# Patient Record
Sex: FEMALE | Race: BLACK OR AFRICAN AMERICAN | NOT HISPANIC OR LATINO | RURAL
[De-identification: names, ages, dates, MRNs, and addresses within clinical notes are randomized per-mention and may not be internally consistent; named-entity substitution may affect disease eponyms.]

---

## 2021-03-12 NOTE — PROCEDURE NOTE: CLINICAL
PROCEDURE NOTE: Punctal Plugs, Silicone OS. Diagnosis: Dry Eye Syndrome. Anesthesia: Topical. Prep: Antibiotic Drops q 5min x 3. Prior to treatment, the risks/benefits/alternatives were discussed. The patient wished to proceed with procedure. One drop of proparacaine was placed and a drop of lidocaine gel was placed over the puncta. Inserted Oasis Softplug 0.8 mm permanent silicone plugs were inserted in Left Lower eyelids. Patient tolerated procedure well. There were no complications. Post procedure instructions given. Alyse Reich

## 2021-04-06 NOTE — PROCEDURE NOTE: SURGICAL
"<p style=""margin-bottom:0in;text-align:justify;line-height:normal;""><span>Prior to commencing surgery patient identification

## 2022-05-18 ENCOUNTER — NEW PATIENT (OUTPATIENT)
Dept: RURAL CLINIC 2 | Facility: CLINIC | Age: 72
End: 2022-05-18

## 2022-05-18 DIAGNOSIS — E11.9: ICD-10-CM

## 2022-05-18 DIAGNOSIS — H52.4: ICD-10-CM

## 2022-05-18 DIAGNOSIS — H25.813: ICD-10-CM

## 2022-05-18 DIAGNOSIS — H16.223: ICD-10-CM

## 2022-05-18 DIAGNOSIS — H40.1134: ICD-10-CM

## 2022-05-18 DIAGNOSIS — H43.812: ICD-10-CM

## 2022-05-18 PROCEDURE — 99204 OFFICE O/P NEW MOD 45 MIN: CPT

## 2022-05-18 PROCEDURE — 92015 DETERMINE REFRACTIVE STATE: CPT

## 2022-05-18 ASSESSMENT — VISUAL ACUITY
OS_CC: 20/20
OD_CC: 20/20

## 2022-05-18 ASSESSMENT — TONOMETRY
OD_IOP_MMHG: 25
OS_IOP_MMHG: 23

## 2022-06-24 ENCOUNTER — ESTABLISHED PATIENT (OUTPATIENT)
Dept: RURAL CLINIC 2 | Facility: CLINIC | Age: 72
End: 2022-06-24

## 2022-06-24 DIAGNOSIS — H43.812: ICD-10-CM

## 2022-06-24 DIAGNOSIS — H40.1131: ICD-10-CM

## 2022-06-24 DIAGNOSIS — H16.223: ICD-10-CM

## 2022-06-24 DIAGNOSIS — H25.813: ICD-10-CM

## 2022-06-24 DIAGNOSIS — E11.9: ICD-10-CM

## 2022-06-24 PROCEDURE — 99213 OFFICE O/P EST LOW 20 MIN: CPT

## 2022-06-24 PROCEDURE — 92020 GONIOSCOPY: CPT

## 2022-06-24 PROCEDURE — 92083 EXTENDED VISUAL FIELD XM: CPT

## 2022-06-24 ASSESSMENT — VISUAL ACUITY
OD_CC: 20/20
OS_CC: 20/20

## 2022-06-24 ASSESSMENT — TONOMETRY
OS_IOP_MMHG: 21
OD_IOP_MMHG: 21

## 2022-06-24 NOTE — PATIENT DISCUSSION
The IOP is borderline. The patient will continue the current management at this time.  LATANOPROST QHS OU.

## 2023-04-03 ENCOUNTER — ESTABLISHED PATIENT (OUTPATIENT)
Dept: RURAL CLINIC 2 | Facility: CLINIC | Age: 73
End: 2023-04-03

## 2023-04-03 DIAGNOSIS — H40.1132: ICD-10-CM

## 2023-04-03 DIAGNOSIS — H43.812: ICD-10-CM

## 2023-04-03 DIAGNOSIS — E11.9: ICD-10-CM

## 2023-04-03 DIAGNOSIS — H16.223: ICD-10-CM

## 2023-04-03 DIAGNOSIS — H25.813: ICD-10-CM

## 2023-04-03 PROCEDURE — 99213 OFFICE O/P EST LOW 20 MIN: CPT

## 2023-04-03 PROCEDURE — 92083 EXTENDED VISUAL FIELD XM: CPT

## 2023-04-03 ASSESSMENT — VISUAL ACUITY
OD_CC: 20/25+4
OS_CC: 20/25+4

## 2023-04-03 ASSESSMENT — TONOMETRY
OS_IOP_MMHG: 23
OD_IOP_MMHG: 25

## 2023-07-06 ENCOUNTER — ESTABLISHED PATIENT (OUTPATIENT)
Dept: RURAL CLINIC 2 | Facility: CLINIC | Age: 73
End: 2023-07-06

## 2023-07-06 DIAGNOSIS — H43.812: ICD-10-CM

## 2023-07-06 DIAGNOSIS — E11.9: ICD-10-CM

## 2023-07-06 DIAGNOSIS — H16.223: ICD-10-CM

## 2023-07-06 DIAGNOSIS — H40.1132: ICD-10-CM

## 2023-07-06 DIAGNOSIS — H25.813: ICD-10-CM

## 2023-07-06 PROCEDURE — 99213 OFFICE O/P EST LOW 20 MIN: CPT

## 2023-07-06 ASSESSMENT — TONOMETRY
OS_IOP_MMHG: 19
OD_IOP_MMHG: 20

## 2023-07-06 ASSESSMENT — VISUAL ACUITY
OD_CC: 20/20
OS_CC: 20/20

## 2023-11-06 ENCOUNTER — ESTABLISHED PATIENT (OUTPATIENT)
Dept: RURAL CLINIC 2 | Facility: CLINIC | Age: 73
End: 2023-11-06

## 2023-11-06 DIAGNOSIS — H34.8310: ICD-10-CM

## 2023-11-06 DIAGNOSIS — H25.813: ICD-10-CM

## 2023-11-06 DIAGNOSIS — H43.812: ICD-10-CM

## 2023-11-06 DIAGNOSIS — H16.223: ICD-10-CM

## 2023-11-06 DIAGNOSIS — E11.9: ICD-10-CM

## 2023-11-06 DIAGNOSIS — H40.1132: ICD-10-CM

## 2023-11-06 PROCEDURE — 92133 CPTRZD OPH DX IMG PST SGM ON: CPT

## 2023-11-06 PROCEDURE — 99214 OFFICE O/P EST MOD 30 MIN: CPT

## 2023-11-06 RX ORDER — BRIMONIDINE TARTRATE, TIMOLOL MALEATE 2; 5 MG/ML; MG/ML: 1 SOLUTION/ DROPS OPHTHALMIC TWICE A DAY

## 2023-11-06 ASSESSMENT — TONOMETRY
OD_IOP_MMHG: 25
OS_IOP_MMHG: 26

## 2023-11-06 ASSESSMENT — VISUAL ACUITY
OS_CC: 20/20
OD_CC: 20/20

## 2024-03-11 ENCOUNTER — ESTABLISHED PATIENT (OUTPATIENT)
Dept: RURAL CLINIC 2 | Facility: CLINIC | Age: 74
End: 2024-03-11

## 2024-03-11 DIAGNOSIS — H34.8310: ICD-10-CM

## 2024-03-11 DIAGNOSIS — E11.9: ICD-10-CM

## 2024-03-11 DIAGNOSIS — H43.812: ICD-10-CM

## 2024-03-11 DIAGNOSIS — H40.1132: ICD-10-CM

## 2024-03-11 DIAGNOSIS — H16.223: ICD-10-CM

## 2024-03-11 DIAGNOSIS — H25.813: ICD-10-CM

## 2024-03-11 PROCEDURE — 92083 EXTENDED VISUAL FIELD XM: CPT

## 2024-03-11 PROCEDURE — 99213 OFFICE O/P EST LOW 20 MIN: CPT

## 2024-03-11 ASSESSMENT — TONOMETRY
OD_IOP_MMHG: 16
OS_IOP_MMHG: 18

## 2024-03-11 ASSESSMENT — VISUAL ACUITY
OD_CC: 20/20
OS_CC: 20/20

## 2024-09-09 ENCOUNTER — COMPREHENSIVE EXAM (OUTPATIENT)
Dept: RURAL CLINIC 2 | Facility: CLINIC | Age: 74
End: 2024-09-09

## 2024-09-09 DIAGNOSIS — H43.812: ICD-10-CM

## 2024-09-09 DIAGNOSIS — H16.223: ICD-10-CM

## 2024-09-09 DIAGNOSIS — H52.03: ICD-10-CM

## 2024-09-09 DIAGNOSIS — H25.813: ICD-10-CM

## 2024-09-09 DIAGNOSIS — H34.8310: ICD-10-CM

## 2024-09-09 DIAGNOSIS — E11.9: ICD-10-CM

## 2024-09-09 DIAGNOSIS — H40.1132: ICD-10-CM

## 2024-09-09 DIAGNOSIS — H52.4: ICD-10-CM

## 2024-09-09 PROCEDURE — 92015 DETERMINE REFRACTIVE STATE: CPT

## 2024-09-09 PROCEDURE — 99214 OFFICE O/P EST MOD 30 MIN: CPT

## 2024-09-09 PROCEDURE — 92133 CPTRZD OPH DX IMG PST SGM ON: CPT

## 2025-01-13 ENCOUNTER — FOLLOW UP (OUTPATIENT)
Age: 75
End: 2025-01-13

## 2025-01-13 DIAGNOSIS — H34.8310: ICD-10-CM

## 2025-01-13 DIAGNOSIS — H16.223: ICD-10-CM

## 2025-01-13 DIAGNOSIS — E11.9: ICD-10-CM

## 2025-01-13 DIAGNOSIS — H40.1132: ICD-10-CM

## 2025-01-13 DIAGNOSIS — H25.813: ICD-10-CM

## 2025-01-13 DIAGNOSIS — H43.812: ICD-10-CM

## 2025-01-13 PROCEDURE — 92083 EXTENDED VISUAL FIELD XM: CPT

## 2025-01-13 PROCEDURE — 99213 OFFICE O/P EST LOW 20 MIN: CPT

## 2025-05-13 ENCOUNTER — DIAGNOSTICS ONLY (OUTPATIENT)
Age: 75
End: 2025-05-13

## 2025-05-13 DIAGNOSIS — H40.1132: ICD-10-CM

## 2025-05-13 DIAGNOSIS — H34.8312: ICD-10-CM

## 2025-05-13 DIAGNOSIS — H16.223: ICD-10-CM

## 2025-05-13 DIAGNOSIS — E11.9: ICD-10-CM

## 2025-05-13 DIAGNOSIS — H43.812: ICD-10-CM

## 2025-05-13 DIAGNOSIS — H25.813: ICD-10-CM

## 2025-05-13 PROCEDURE — 99214 OFFICE O/P EST MOD 30 MIN: CPT

## 2025-05-13 PROCEDURE — 92134 CPTRZ OPH DX IMG PST SGM RTA: CPT

## 2025-07-23 ENCOUNTER — CONSULTATION/EVALUATION (OUTPATIENT)
Age: 75
End: 2025-07-23

## 2025-07-23 DIAGNOSIS — H43.812: ICD-10-CM

## 2025-07-23 DIAGNOSIS — E11.9: ICD-10-CM

## 2025-07-23 DIAGNOSIS — H25.813: ICD-10-CM

## 2025-07-23 DIAGNOSIS — H40.1132: ICD-10-CM

## 2025-07-23 DIAGNOSIS — H34.8312: ICD-10-CM

## 2025-07-23 PROCEDURE — 99214 OFFICE O/P EST MOD 30 MIN: CPT

## 2025-07-23 PROCEDURE — 92136 OPHTHALMIC BIOMETRY: CPT

## 2025-07-23 PROCEDURE — 92134 CPTRZ OPH DX IMG PST SGM RTA: CPT

## 2025-07-23 PROCEDURE — 92025 CPTRIZED CORNEAL TOPOGRAPHY: CPT | Mod: NC
